# Patient Record
Sex: FEMALE | Race: WHITE | NOT HISPANIC OR LATINO | Employment: FULL TIME | URBAN - NONMETROPOLITAN AREA
[De-identification: names, ages, dates, MRNs, and addresses within clinical notes are randomized per-mention and may not be internally consistent; named-entity substitution may affect disease eponyms.]

---

## 2021-10-22 ENCOUNTER — NON-PROVIDER VISIT (OUTPATIENT)
Dept: URGENT CARE | Facility: PHYSICIAN GROUP | Age: 31
End: 2021-10-22
Payer: COMMERCIAL

## 2021-10-22 DIAGNOSIS — Z02.1 PRE-EMPLOYMENT DRUG SCREENING: ICD-10-CM

## 2021-10-22 LAB
AMP AMPHETAMINE: NORMAL
COC COCAINE: NORMAL
INT CON NEG: NORMAL
INT CON POS: NORMAL
MET METHAMPHETAMINES: NORMAL
OPI OPIATES: NORMAL
PCP PHENCYCLIDINE: NORMAL
POC DRUG COMMENT 753798-OCCUPATIONAL HEALTH: NORMAL
THC: NORMAL

## 2021-10-22 PROCEDURE — 80305 DRUG TEST PRSMV DIR OPT OBS: CPT | Performed by: PHYSICIAN ASSISTANT

## 2021-11-16 ENCOUNTER — OFFICE VISIT (OUTPATIENT)
Dept: URGENT CARE | Facility: PHYSICIAN GROUP | Age: 31
End: 2021-11-16
Payer: COMMERCIAL

## 2021-11-16 VITALS
OXYGEN SATURATION: 100 % | DIASTOLIC BLOOD PRESSURE: 76 MMHG | HEART RATE: 87 BPM | WEIGHT: 134 LBS | SYSTOLIC BLOOD PRESSURE: 122 MMHG | BODY MASS INDEX: 20.31 KG/M2 | RESPIRATION RATE: 14 BRPM | TEMPERATURE: 97.6 F | HEIGHT: 68 IN

## 2021-11-16 DIAGNOSIS — R19.09 GROIN MASS IN FEMALE: ICD-10-CM

## 2021-11-16 PROCEDURE — 99204 OFFICE O/P NEW MOD 45 MIN: CPT | Performed by: PHYSICIAN ASSISTANT

## 2021-11-16 RX ORDER — LEVONORGESTREL AND ETHINYL ESTRADIOL 0.15-0.03
1 KIT ORAL DAILY
COMMUNITY
Start: 2021-01-16 | End: 2023-07-13

## 2021-11-17 NOTE — PROGRESS NOTES
"Chief Complaint   Patient presents with   • Bump     painful pt states, hand brushed it and it hurt, feels like marble sized pt states       HISTORY OF PRESENT ILLNESS: Patient is a 31 y.o. female who presents today for the following:    Painful lump right groin  Noticed it this morning when she accidentally hit her leg  Noticed a lump that is painful, about the size of a pea  Denies recent fevers, body aches, chills, or unexplained weight loss  Denies any other significant past medical history  Denies abdominal surgeries  Denies vaginal discharge, vaginal pain, urinary symptoms    There are no problems to display for this patient.      Allergies:Patient has no known allergies.    Current Outpatient Medications Ordered in Epic   Medication Sig Dispense Refill   • levonorgestrel-ethinyl estradiol (MIRZA-28) 0.15-30 MG-MCG per tablet Take 1 Tablet by mouth every day.       No current [facility]-ordered facility-administered medications on file.       History reviewed. No pertinent past medical history.    Social History     Tobacco Use   • Smoking status: Never Smoker   • Smokeless tobacco: Never Used   Substance Use Topics   • Alcohol use: Never   • Drug use: Never       No family status information on file.   History reviewed. No pertinent family history.    Review of Systems:   Constitutional ROS: No unexpected change in weight, No weakness, No fatigue  Pulmonary ROS: No chronic cough, sputum, or hemoptysis, No dyspnea on exertion, No wheezing  Cardiovascular ROS: No diaphoresis, No edema, No palpitations  Musculoskeletal/Extremities ROS: Right groin lump  Hematologic/Lymphatic ROS: No chills, No night sweats, No weight loss  Skin/Integumentary ROS: No edema, No evidence of rash, No itching      Exam:  /76   Pulse 87   Temp 36.4 °C (97.6 °F)   Resp 14   Ht 1.727 m (5' 8\")   Wt 60.8 kg (134 lb)   SpO2 100%   General: Well developed, well nourished. No distress.    HENT: Head is grossly normal.  Pulmonary: " Unlabored respiratory effort.   Neurologic: Grossly nonfocal. No facial asymmetry noted.  Musculoskeletal: Palpable mass noted in the right groin, approximately 5 mm x 3 mm and is tender to touch.  No skin changes superficial to the mass.  Skin: Warm, dry, good turgor. No rashes in visible areas.   Psych: Normal mood. Alert and oriented to person, place and time.    Assessment/Plan:  Patient will schedule ultrasound as an outpatient at her convenience.  Discussed appropriate over-the-counter symptomatic medication, and when to return to clinic. Follow up for worsening or persistent symptoms.  1. Groin mass in female  US-INGUINAL HERNIA

## 2023-02-21 ENCOUNTER — NON-PROVIDER VISIT (OUTPATIENT)
Dept: URGENT CARE | Facility: PHYSICIAN GROUP | Age: 33
End: 2023-02-21

## 2023-02-21 DIAGNOSIS — Z02.1 PRE-EMPLOYMENT DRUG SCREENING: ICD-10-CM

## 2023-02-21 PROCEDURE — 80305 DRUG TEST PRSMV DIR OPT OBS: CPT | Performed by: FAMILY MEDICINE

## 2023-09-19 ENCOUNTER — HOSPITAL ENCOUNTER (OUTPATIENT)
Dept: LAB | Facility: MEDICAL CENTER | Age: 33
End: 2023-09-19
Attending: STUDENT IN AN ORGANIZED HEALTH CARE EDUCATION/TRAINING PROGRAM
Payer: COMMERCIAL

## 2023-09-19 LAB
CHOLEST SERPL-MCNC: 148 MG/DL (ref 100–199)
FASTING STATUS PATIENT QL REPORTED: NORMAL
HDLC SERPL-MCNC: 49 MG/DL
LDLC SERPL CALC-MCNC: 89 MG/DL
TRIGL SERPL-MCNC: 52 MG/DL (ref 0–149)

## 2023-09-19 PROCEDURE — 36415 COLL VENOUS BLD VENIPUNCTURE: CPT

## 2023-09-19 PROCEDURE — 80061 LIPID PANEL: CPT
